# Patient Record
Sex: MALE
[De-identification: names, ages, dates, MRNs, and addresses within clinical notes are randomized per-mention and may not be internally consistent; named-entity substitution may affect disease eponyms.]

---

## 2020-12-14 PROBLEM — Z00.00 ENCOUNTER FOR PREVENTIVE HEALTH EXAMINATION: Status: ACTIVE | Noted: 2020-12-14

## 2020-12-23 ENCOUNTER — APPOINTMENT (OUTPATIENT)
Dept: UROLOGY | Facility: CLINIC | Age: 55
End: 2020-12-23
Payer: COMMERCIAL

## 2020-12-23 VITALS
WEIGHT: 200 LBS | HEIGHT: 69 IN | TEMPERATURE: 98.7 F | HEART RATE: 74 BPM | OXYGEN SATURATION: 94 % | DIASTOLIC BLOOD PRESSURE: 94 MMHG | BODY MASS INDEX: 29.62 KG/M2 | SYSTOLIC BLOOD PRESSURE: 163 MMHG | RESPIRATION RATE: 14 BRPM

## 2020-12-23 DIAGNOSIS — Z86.39 PERSONAL HISTORY OF OTHER ENDOCRINE, NUTRITIONAL AND METABOLIC DISEASE: ICD-10-CM

## 2020-12-23 DIAGNOSIS — F17.200 NICOTINE DEPENDENCE, UNSPECIFIED, UNCOMPLICATED: ICD-10-CM

## 2020-12-23 DIAGNOSIS — Z86.79 PERSONAL HISTORY OF OTHER DISEASES OF THE CIRCULATORY SYSTEM: ICD-10-CM

## 2020-12-23 PROCEDURE — 99203 OFFICE O/P NEW LOW 30 MIN: CPT

## 2020-12-23 PROCEDURE — 99072 ADDL SUPL MATRL&STAF TM PHE: CPT

## 2020-12-23 NOTE — HISTORY OF PRESENT ILLNESS
[FreeTextEntry1] : He is a 55-year-old man who is seen today for initial visit.  He is complaining of worsening erectile dysfunction in the last year.  He has difficulty maintaining and obtaining erections.  Desire is normal.  He used to be an alcoholic but has not had anything to drink for over 8 years.  He is a smoker.  He has history of hyperlipidemia.  He believes that recent labs were done by his primary care physician.  He did not bring a list of his medications with him today.

## 2020-12-23 NOTE — LETTER BODY
[Dear  ___] : Dear  [unfilled], [Consult Letter:] : I had the pleasure of evaluating your patient, [unfilled]. [Consult Closing:] : Thank you very much for allowing me to participate in the care of this patient.  If you have any questions, please do not hesitate to contact me. [FreeTextEntry1] : \par \par Address: [address] [place] [address]\par Phone: [phone]

## 2020-12-23 NOTE — REVIEW OF SYSTEMS
[Eyesight Problems] : eyesight problems [Poor quality erections] : Poor quality erections [No erections] : no erections [Wake up at night to urinate  How many times?  ___] : wakes up to urinate [unfilled] times during the night [Negative] : Heme/Lymph

## 2020-12-23 NOTE — ASSESSMENT
[FreeTextEntry1] : We will try to obtain his recent laboratory values.  He did not bring a list of his medications with him today.  Try to stop smoking.  He has not drank any alcohol in many years.  We discussed the underlying mechanism for erections, pathophysiology of erectile dysfunction (ED) and treatment options. Role of smoking, diabetes, hypertension, hyperlipidemia, coronary artery disease and treatment for benign and malignant prostate conditions was discussed as some of the more common causes of ED. Exercise, weight loss and a healthy lifestyle can be beneficial. We discussed testosterone (T) and its possible link to increased desire for sexual activity, feeling energetic, muscle mass, etc. Low T as a cause for ED is less clear. Mechanism by which PDE-5 inhibitors improve erections was discussed. Medications in this category include Viagra, Levitra, Staxyn, Cialis and Stendra. As needed versus daily dosing was discussed.  He can start by trying generic Viagra.  A prescription was sent to his pharmacy.  Follow-up in a few months to assess his response.\par \par Tucker Dean MD, FACS\par [facility]\par  of Urology\par \par [address], [address]\[place] [address]\par \par [address], [address]\[place] [address]\par \par Tel: [phone]\par Fax: [phone]

## 2020-12-23 NOTE — PHYSICAL EXAM
[General Appearance - Well Developed] : well developed [General Appearance - Well Nourished] : well nourished [Normal Appearance] : normal appearance [General Appearance - In No Acute Distress] : no acute distress [Edema] : no peripheral edema [Respiration, Rhythm And Depth] : normal respiratory rhythm and effort [Exaggerated Use Of Accessory Muscles For Inspiration] : no accessory muscle use [Abdomen Soft] : soft [Abdomen Tenderness] : non-tender [Costovertebral Angle Tenderness] : no ~M costovertebral angle tenderness [Urethral Meatus] : meatus normal [Urinary Bladder Findings] : the bladder was normal on palpation [Scrotum] : the scrotum was normal [Testes Tenderness] : no tenderness of the testes [Testes Mass (___cm)] : there were no testicular masses [FreeTextEntry1] : Declined prostate examination [Normal Station and Gait] : the gait and station were normal for the patient's age [] : no rash [No Focal Deficits] : no focal deficits [Oriented To Time, Place, And Person] : oriented to person, place, and time [Affect] : the affect was normal [Mood] : the mood was normal [Not Anxious] : not anxious [Inguinal Lymph Nodes Enlarged Bilaterally] : inguinal

## 2021-04-02 ENCOUNTER — APPOINTMENT (OUTPATIENT)
Dept: UROLOGY | Facility: CLINIC | Age: 56
End: 2021-04-02
Payer: COMMERCIAL

## 2021-04-02 VITALS
DIASTOLIC BLOOD PRESSURE: 80 MMHG | HEIGHT: 69 IN | WEIGHT: 240 LBS | TEMPERATURE: 98 F | SYSTOLIC BLOOD PRESSURE: 140 MMHG | BODY MASS INDEX: 35.55 KG/M2

## 2021-04-02 DIAGNOSIS — N52.9 MALE ERECTILE DYSFUNCTION, UNSPECIFIED: ICD-10-CM

## 2021-04-02 PROCEDURE — 99072 ADDL SUPL MATRL&STAF TM PHE: CPT

## 2021-04-02 PROCEDURE — 99213 OFFICE O/P EST LOW 20 MIN: CPT

## 2021-04-02 RX ORDER — SIMVASTATIN 10 MG/1
10 TABLET, FILM COATED ORAL
Refills: 0 | Status: ACTIVE | COMMUNITY

## 2021-04-02 RX ORDER — SILDENAFIL 100 MG/1
100 TABLET, FILM COATED ORAL
Qty: 6 | Refills: 6 | Status: ACTIVE | COMMUNITY
Start: 2021-04-02 | End: 1900-01-01

## 2021-04-02 NOTE — PHYSICAL EXAM
[General Appearance - Well Developed] : well developed [General Appearance - Well Nourished] : well nourished [Normal Appearance] : normal appearance [Well Groomed] : well groomed [Abdomen Soft] : soft [General Appearance - In No Acute Distress] : no acute distress [Abdomen Tenderness] : non-tender [Costovertebral Angle Tenderness] : no ~M costovertebral angle tenderness [Urethral Meatus] : meatus normal [Urinary Bladder Findings] : the bladder was normal on palpation [Scrotum] : the scrotum was normal [Testes Tenderness] : no tenderness of the testes [FreeTextEntry1] : Declined VISHNU. [Testes Mass (___cm)] : there were no testicular masses

## 2021-04-02 NOTE — HISTORY OF PRESENT ILLNESS
[FreeTextEntry1] : He is a 55-year-old man who is seen today in follow-up.  He was seen first in December 2020 for erectile dysfunction.  He does not have significant urinary symptoms.  He was prescribed sildenafil 20 mg.  He was supposed to use this medication as needed up to 100 mg.  Instead he used 20 mg on a daily basis.  He noticed slight improvement. \par Previous notes: He is complaining of worsening erectile dysfunction in the last year.  He has difficulty maintaining and obtaining erections.  Desire is normal.  He used to be an alcoholic but has not had anything to drink for over 8 years.  He is a smoker.  He has history of hyperlipidemia.

## 2021-04-02 NOTE — ASSESSMENT
[FreeTextEntry1] : He was instructed appropriately how to use sildenafil as needed about 1 hour prior to sexual activity up to 100 mg.  Prescription was refilled for him.  Request was given to him to have PSA and testosterone levels done.  He can follow-up in about 6 months to reassess his response.\par \par Tucker Dean MD, FACS\par [facility]\par  of Urology\par \par [address], [address]\[place] [address]\par \par [address], [address]\[place] [address]\par \par Tel: [phone]\par Fax: [phone]

## 2021-06-28 ENCOUNTER — APPOINTMENT (OUTPATIENT)
Dept: UROLOGY | Facility: CLINIC | Age: 56
End: 2021-06-28

## 2021-12-28 ENCOUNTER — RX RENEWAL (OUTPATIENT)
Age: 56
End: 2021-12-28

## 2021-12-28 RX ORDER — SILDENAFIL 20 MG/1
20 TABLET ORAL
Qty: 90 | Refills: 1 | Status: ACTIVE | COMMUNITY
Start: 2020-12-23 | End: 1900-01-01